# Patient Record
Sex: MALE | Race: WHITE | Employment: OTHER | URBAN - METROPOLITAN AREA
[De-identification: names, ages, dates, MRNs, and addresses within clinical notes are randomized per-mention and may not be internally consistent; named-entity substitution may affect disease eponyms.]

---

## 2017-08-01 ENCOUNTER — APPOINTMENT (OUTPATIENT)
Dept: GENERAL RADIOLOGY | Age: 82
End: 2017-08-01
Payer: COMMERCIAL

## 2017-08-01 ENCOUNTER — APPOINTMENT (OUTPATIENT)
Dept: CT IMAGING | Age: 82
End: 2017-08-01
Payer: COMMERCIAL

## 2017-08-01 ENCOUNTER — HOSPITAL ENCOUNTER (OUTPATIENT)
Age: 82
Setting detail: OBSERVATION
Discharge: HOME | End: 2017-08-02
Attending: EMERGENCY MEDICINE | Admitting: FAMILY MEDICINE
Payer: COMMERCIAL

## 2017-08-01 DIAGNOSIS — R91.8 LUNG MASS: ICD-10-CM

## 2017-08-01 DIAGNOSIS — R47.01 APHASIA: ICD-10-CM

## 2017-08-01 DIAGNOSIS — I67.9 CEREBRAL VASCULAR DISEASE: ICD-10-CM

## 2017-08-01 DIAGNOSIS — R41.82 ALTERED MENTAL STATUS, UNSPECIFIED ALTERED MENTAL STATUS TYPE: Primary | ICD-10-CM

## 2017-08-01 LAB
ABSOLUTE EOS #: 0.1 K/UL (ref 0–0.4)
ABSOLUTE EOS #: 0.1 K/UL (ref 0–0.4)
ABSOLUTE LYMPH #: 1.8 K/UL (ref 1–4.8)
ABSOLUTE LYMPH #: 2 K/UL (ref 1–4.8)
ABSOLUTE MONO #: 0.9 K/UL (ref 0.1–1.3)
ABSOLUTE MONO #: 1.1 K/UL (ref 0.1–1.3)
ALBUMIN SERPL-MCNC: 4.1 G/DL (ref 3.5–5.2)
ALBUMIN SERPL-MCNC: 4.3 G/DL (ref 3.5–5.2)
ALBUMIN/GLOBULIN RATIO: NORMAL (ref 1–2.5)
ALBUMIN/GLOBULIN RATIO: NORMAL (ref 1–2.5)
ALP BLD-CCNC: 84 U/L (ref 40–129)
ALP BLD-CCNC: 87 U/L (ref 40–129)
ALT SERPL-CCNC: 14 U/L (ref 5–41)
ALT SERPL-CCNC: 15 U/L (ref 5–41)
ANION GAP SERPL CALCULATED.3IONS-SCNC: 14 MMOL/L (ref 9–17)
ANION GAP SERPL CALCULATED.3IONS-SCNC: 14 MMOL/L (ref 9–17)
AST SERPL-CCNC: 16 U/L
AST SERPL-CCNC: 23 U/L
BASOPHILS # BLD: 1 %
BASOPHILS # BLD: 1 %
BASOPHILS ABSOLUTE: 0.1 K/UL (ref 0–0.2)
BASOPHILS ABSOLUTE: 0.1 K/UL (ref 0–0.2)
BILIRUB SERPL-MCNC: 0.58 MG/DL (ref 0.3–1.2)
BILIRUB SERPL-MCNC: 0.62 MG/DL (ref 0.3–1.2)
BILIRUBIN DIRECT: 0.13 MG/DL
BILIRUBIN DIRECT: 0.15 MG/DL
BILIRUBIN URINE: NEGATIVE
BILIRUBIN, INDIRECT: 0.45 MG/DL (ref 0–1)
BILIRUBIN, INDIRECT: 0.47 MG/DL (ref 0–1)
BUN BLDV-MCNC: 17 MG/DL (ref 8–23)
BUN BLDV-MCNC: 17 MG/DL (ref 8–23)
BUN/CREAT BLD: NORMAL (ref 9–20)
BUN/CREAT BLD: NORMAL (ref 9–20)
CALCIUM SERPL-MCNC: 9.8 MG/DL (ref 8.6–10.4)
CALCIUM SERPL-MCNC: 9.9 MG/DL (ref 8.6–10.4)
CHLORIDE BLD-SCNC: 100 MMOL/L (ref 98–107)
CHLORIDE BLD-SCNC: 99 MMOL/L (ref 98–107)
CO2: 23 MMOL/L (ref 20–31)
CO2: 23 MMOL/L (ref 20–31)
COLOR: YELLOW
COMMENT UA: NORMAL
CREAT SERPL-MCNC: 0.82 MG/DL (ref 0.7–1.2)
CREAT SERPL-MCNC: 0.84 MG/DL (ref 0.7–1.2)
DIFFERENTIAL TYPE: ABNORMAL
DIFFERENTIAL TYPE: NORMAL
EOSINOPHILS RELATIVE PERCENT: 1 %
EOSINOPHILS RELATIVE PERCENT: 1 %
GFR AFRICAN AMERICAN: >60 ML/MIN
GFR AFRICAN AMERICAN: >60 ML/MIN
GFR NON-AFRICAN AMERICAN: >60 ML/MIN
GFR NON-AFRICAN AMERICAN: >60 ML/MIN
GFR SERPL CREATININE-BSD FRML MDRD: NORMAL ML/MIN/{1.73_M2}
GLOBULIN: NORMAL G/DL (ref 1.5–3.8)
GLOBULIN: NORMAL G/DL (ref 1.5–3.8)
GLUCOSE BLD-MCNC: 91 MG/DL (ref 70–99)
GLUCOSE BLD-MCNC: 94 MG/DL (ref 70–99)
GLUCOSE URINE: NEGATIVE
HCT VFR BLD CALC: 44.9 % (ref 41–53)
HCT VFR BLD CALC: 45.7 % (ref 41–53)
HEMOGLOBIN: 15.1 G/DL (ref 13.5–17.5)
HEMOGLOBIN: 15.4 G/DL (ref 13.5–17.5)
KETONES, URINE: NEGATIVE
LACTIC ACID: 1 MMOL/L (ref 0.5–2.2)
LEUKOCYTE ESTERASE, URINE: NEGATIVE
LYMPHOCYTES # BLD: 15 %
LYMPHOCYTES # BLD: 18 %
MCH RBC QN AUTO: 30.5 PG (ref 26–34)
MCH RBC QN AUTO: 30.5 PG (ref 26–34)
MCHC RBC AUTO-ENTMCNC: 33.6 G/DL (ref 31–37)
MCHC RBC AUTO-ENTMCNC: 33.7 G/DL (ref 31–37)
MCV RBC AUTO: 90.6 FL (ref 80–100)
MCV RBC AUTO: 90.8 FL (ref 80–100)
MONOCYTES # BLD: 10 %
MONOCYTES # BLD: 8 %
NITRITE, URINE: NEGATIVE
PDW BLD-RTO: 13.8 % (ref 11.5–14.9)
PDW BLD-RTO: 14 % (ref 11.5–14.9)
PH UA: 6 (ref 5–8)
PLATELET # BLD: 164 K/UL (ref 150–450)
PLATELET # BLD: 167 K/UL (ref 150–450)
PLATELET ESTIMATE: ABNORMAL
PLATELET ESTIMATE: NORMAL
PMV BLD AUTO: 10 FL (ref 6–12)
PMV BLD AUTO: 9.9 FL (ref 6–12)
POTASSIUM SERPL-SCNC: 4.3 MMOL/L (ref 3.7–5.3)
POTASSIUM SERPL-SCNC: 4.5 MMOL/L (ref 3.7–5.3)
PROTEIN UA: NEGATIVE
RBC # BLD: 4.96 M/UL (ref 4.5–5.9)
RBC # BLD: 5.04 M/UL (ref 4.5–5.9)
RBC # BLD: ABNORMAL 10*6/UL
RBC # BLD: NORMAL 10*6/UL
SEG NEUTROPHILS: 72 %
SEG NEUTROPHILS: 73 %
SEGMENTED NEUTROPHILS ABSOLUTE COUNT: 7.8 K/UL (ref 1.3–9.1)
SEGMENTED NEUTROPHILS ABSOLUTE COUNT: 8.4 K/UL (ref 1.3–9.1)
SODIUM BLD-SCNC: 136 MMOL/L (ref 135–144)
SODIUM BLD-SCNC: 137 MMOL/L (ref 135–144)
SPECIFIC GRAVITY UA: 1.01 (ref 1–1.03)
TOTAL PROTEIN: 6.9 G/DL (ref 6.4–8.3)
TOTAL PROTEIN: 7 G/DL (ref 6.4–8.3)
TROPONIN INTERP: NORMAL
TROPONIN INTERP: NORMAL
TROPONIN T: <0.03 NG/ML
TROPONIN T: <0.03 NG/ML
TURBIDITY: CLEAR
URINE HGB: NEGATIVE
UROBILINOGEN, URINE: NORMAL
WBC # BLD: 10.8 K/UL (ref 3.5–11)
WBC # BLD: 11.5 K/UL (ref 3.5–11)
WBC # BLD: ABNORMAL 10*3/UL
WBC # BLD: NORMAL 10*3/UL

## 2017-08-01 PROCEDURE — 81003 URINALYSIS AUTO W/O SCOPE: CPT

## 2017-08-01 PROCEDURE — 83605 ASSAY OF LACTIC ACID: CPT

## 2017-08-01 PROCEDURE — 99285 EMERGENCY DEPT VISIT HI MDM: CPT

## 2017-08-01 PROCEDURE — 6370000000 HC RX 637 (ALT 250 FOR IP): Performed by: FAMILY MEDICINE

## 2017-08-01 PROCEDURE — G0378 HOSPITAL OBSERVATION PER HR: HCPCS

## 2017-08-01 PROCEDURE — 87040 BLOOD CULTURE FOR BACTERIA: CPT

## 2017-08-01 PROCEDURE — 85025 COMPLETE CBC W/AUTO DIFF WBC: CPT

## 2017-08-01 PROCEDURE — 6360000002 HC RX W HCPCS: Performed by: FAMILY MEDICINE

## 2017-08-01 PROCEDURE — 71010 XR CHEST PORTABLE: CPT

## 2017-08-01 PROCEDURE — 2060000000 HC ICU INTERMEDIATE R&B

## 2017-08-01 PROCEDURE — 80076 HEPATIC FUNCTION PANEL: CPT

## 2017-08-01 PROCEDURE — 84484 ASSAY OF TROPONIN QUANT: CPT

## 2017-08-01 PROCEDURE — 93005 ELECTROCARDIOGRAM TRACING: CPT

## 2017-08-01 PROCEDURE — 70450 CT HEAD/BRAIN W/O DYE: CPT

## 2017-08-01 PROCEDURE — 6370000000 HC RX 637 (ALT 250 FOR IP): Performed by: EMERGENCY MEDICINE

## 2017-08-01 PROCEDURE — 6370000000 HC RX 637 (ALT 250 FOR IP): Performed by: PSYCHIATRY & NEUROLOGY

## 2017-08-01 PROCEDURE — 80048 BASIC METABOLIC PNL TOTAL CA: CPT

## 2017-08-01 PROCEDURE — 96372 THER/PROPH/DIAG INJ SC/IM: CPT

## 2017-08-01 PROCEDURE — 36415 COLL VENOUS BLD VENIPUNCTURE: CPT

## 2017-08-01 RX ORDER — ACETAMINOPHEN 325 MG/1
650 TABLET ORAL EVERY 4 HOURS PRN
Status: DISCONTINUED | OUTPATIENT
Start: 2017-08-01 | End: 2017-08-01

## 2017-08-01 RX ORDER — SODIUM CHLORIDE 0.9 % (FLUSH) 0.9 %
10 SYRINGE (ML) INJECTION EVERY 12 HOURS SCHEDULED
Status: DISCONTINUED | OUTPATIENT
Start: 2017-08-01 | End: 2017-08-02 | Stop reason: HOSPADM

## 2017-08-01 RX ORDER — ROPINIROLE 0.5 MG/1
0.5 TABLET, FILM COATED ORAL NIGHTLY
COMMUNITY

## 2017-08-01 RX ORDER — TRAMADOL HYDROCHLORIDE 50 MG/1
50 TABLET ORAL EVERY 6 HOURS PRN
COMMUNITY

## 2017-08-01 RX ORDER — ASPIRIN 81 MG/1
324 TABLET, CHEWABLE ORAL ONCE
Status: COMPLETED | OUTPATIENT
Start: 2017-08-01 | End: 2017-08-01

## 2017-08-01 RX ORDER — SODIUM CHLORIDE 0.9 % (FLUSH) 0.9 %
10 SYRINGE (ML) INJECTION PRN
Status: DISCONTINUED | OUTPATIENT
Start: 2017-08-01 | End: 2017-08-02 | Stop reason: HOSPADM

## 2017-08-01 RX ORDER — HALOPERIDOL 5 MG/ML
5 INJECTION INTRAMUSCULAR
Status: COMPLETED | OUTPATIENT
Start: 2017-08-01 | End: 2017-08-01

## 2017-08-01 RX ORDER — ACETAMINOPHEN 325 MG/1
650 TABLET ORAL EVERY 4 HOURS PRN
Status: DISCONTINUED | OUTPATIENT
Start: 2017-08-01 | End: 2017-08-02 | Stop reason: HOSPADM

## 2017-08-01 RX ORDER — SODIUM CHLORIDE 0.9 % (FLUSH) 0.9 %
10 SYRINGE (ML) INJECTION EVERY 12 HOURS SCHEDULED
Status: DISCONTINUED | OUTPATIENT
Start: 2017-08-01 | End: 2017-08-01

## 2017-08-01 RX ORDER — LORAZEPAM 0.5 MG/1
0.5 TABLET ORAL EVERY 8 HOURS PRN
Status: DISCONTINUED | OUTPATIENT
Start: 2017-08-01 | End: 2017-08-02 | Stop reason: HOSPADM

## 2017-08-01 RX ORDER — CITALOPRAM 20 MG/1
40 TABLET ORAL DAILY
Status: ON HOLD | COMMUNITY
End: 2017-08-02

## 2017-08-01 RX ORDER — HYDRALAZINE HYDROCHLORIDE 25 MG/1
25 TABLET, FILM COATED ORAL 2 TIMES DAILY PRN
Status: DISCONTINUED | OUTPATIENT
Start: 2017-08-01 | End: 2017-08-02 | Stop reason: HOSPADM

## 2017-08-01 RX ORDER — PREDNISONE 10 MG/1
20 TABLET ORAL DAILY
COMMUNITY

## 2017-08-01 RX ORDER — ATENOLOL 100 MG/1
100 TABLET ORAL 2 TIMES DAILY
COMMUNITY

## 2017-08-01 RX ORDER — TAMSULOSIN HYDROCHLORIDE 0.4 MG/1
0.4 CAPSULE ORAL DAILY
COMMUNITY

## 2017-08-01 RX ORDER — DONEPEZIL HYDROCHLORIDE 10 MG/1
10 TABLET, FILM COATED ORAL NIGHTLY
COMMUNITY

## 2017-08-01 RX ORDER — SODIUM CHLORIDE 0.9 % (FLUSH) 0.9 %
10 SYRINGE (ML) INJECTION PRN
Status: DISCONTINUED | OUTPATIENT
Start: 2017-08-01 | End: 2017-08-01

## 2017-08-01 RX ORDER — TRAZODONE HYDROCHLORIDE 50 MG/1
50 TABLET ORAL NIGHTLY
COMMUNITY

## 2017-08-01 RX ADMIN — HALOPERIDOL LACTATE 5 MG: 5 INJECTION, SOLUTION INTRAMUSCULAR at 21:25

## 2017-08-01 RX ADMIN — LORAZEPAM 0.5 MG: 0.5 TABLET ORAL at 22:45

## 2017-08-01 RX ADMIN — ASPIRIN 81 MG 324 MG: 81 TABLET ORAL at 16:27

## 2017-08-01 ASSESSMENT — ENCOUNTER SYMPTOMS
ABDOMINAL PAIN: 0
BACK PAIN: 0
SHORTNESS OF BREATH: 0

## 2017-08-01 NOTE — FLOWSHEET NOTE
08/01/17 1939   Provider Notification   Reason for Communication Review case   Provider Name  Emiliehaylie Parish   Provider Notification Physician   Method of Communication Call   Response See orders   Notification Time Dickson Gutierrez spoke with physician regarding pt statement that he wants to kill himself. Writer was also informed that the pt has a CCW and a gun at home. Security is called by management to search pt and room for weapon. 1:1 was called in to sit with patient regarding suicidal thoughts. Someone with pt at this time. Pt calm laying in bed. Orders were given for agitation as the patient has dementia and sundowners. Writer was informed that the patient has a history of aggression when in similar situations. Will continue to monitor.

## 2017-08-01 NOTE — IP AVS SNAPSHOT
Patient Information     Patient Name LIZZ Up 1928      Important Information for Stroke      If you have a current diagnosis or history of any of the following, please review the information carefully. STROKE PATIENTS:  If you notice any sign or symptom that indicates that you may be having a stroke, activate the emergency medical services immediately by calling [id number]. These symptoms include: uneven facial expression, arm(s) drifting down, strange speech or loss of speech, vision problems, sudden severe headache, sudden numbness or face/arms/legs, sudden confusion or difficult understanding, sudden dizziness, sudden difficulty with walking. Continue or begin taking the medications prescribed by your physician as listed above. There are personal risk that are associated with [place]. Anyone can have a stroke no matter your age, race or gender. The chances of having a stroke increase if you have certain risk factors. The best way to protect yourself and loved ones from stroke is to understand your personal risk and how to manage it. There are 2 types of risk factors for stroke: uncontrollable and controllable. Uncontrollable risk factors include being over age 54, being male, being ,  or /, or having a personal or family history of a stroke or transient ischemic attack (TIA). Controllable risk factors generally fall into two categories: lifestyle risk factors or medical risk factors. Lifestyle risk factors can often be changed, while medical risk factors can usually be treated. Both types can be managed best by working with a doctor, who can prescribe medications and advise on how to adopt a healthy lifestyle. Controllable risk factors include high blood pressure, atrial fibrillation, high cholesterol, diabetes, atherosclerosis, circulation problems, tobacco use or smoking, alcohol use, lack of exercise, and obesity.

## 2017-08-01 NOTE — ED PROVIDER NOTES
[facility]  eMERGENCY dEPARTMENT eNCOUnter      Pt Name: Jimmy Mendoza  MRN: [id number]  [place] 1/21/1928  Date of evaluation: 8/1/17      CHIEF COMPLAINT       Chief Complaint   Patient presents with    Altered Mental Status         HISTORY OF PRESENT ILLNESS   HPI 80 y.o. male is brought to the emergency department by EMS for confusion. The patient was found wandering at a gas station confused, with garbled speech. Unknown last known normal.  EMS report that while in transport his speech greatly improved. Patient states that he \"doesn't know what's going on\". He's having difficulty expressing himself. He doesn't know what time the symptoms started. Denies any weakness in his arms or legs. REVIEW OF SYSTEMS       Review of Systems   Constitutional: Negative for fever. HENT: Negative for congestion. Eyes: Negative for visual disturbance. Respiratory: Negative for shortness of breath. Cardiovascular: Negative for chest pain. Gastrointestinal: Negative for abdominal pain. Endocrine: Negative for polyuria. Musculoskeletal: Negative for back pain. Neurological: Positive for speech difficulty. Negative for tremors, seizures, syncope, weakness, light-headedness, numbness and headaches. Hematological: Negative for adenopathy.        PAST MEDICAL HISTORY     Past Medical History:   Diagnosis Date    BPH (benign prostatic hyperplasia)     Hyperlipidemia     Hypertension        SURGICAL HISTORY       Past Surgical History:   Procedure Laterality Date    AORTIC VALVE REPLACEMENT      BACK SURGERY      EYE SURGERY      FEMUR FRACTURE SURGERY  1970    plates and screws       CURRENT MEDICATIONS       Current Discharge Medication List      CONTINUE these medications which have NOT CHANGED    Details   atenolol (TENORMIN) 100 MG tablet Take 100 mg by mouth 2 times daily      traMADol (ULTRAM) 50 MG tablet Take 50 mg by mouth every 6 hours as needed for Pain      tamsulosin (FLOMAX) 0.4 MG capsule Take 0.4 mg by mouth daily      rOPINIRole (REQUIP) 0.5 MG tablet Take 0.5 mg by mouth nightly Hasn't had this filled since 5/17/17 (30 day supply)      citalopram (CELEXA) 20 MG tablet Take 40 mg by mouth daily      donepezil (ARICEPT) 10 MG tablet Take 10 mg by mouth nightly      Lansoprazole (PREVACID PO) Take 30 mg by mouth daily      predniSONE (DELTASONE) 10 MG tablet Take 20 mg by mouth daily      traZODone (DESYREL) 50 MG tablet Take 50 mg by mouth nightly             ALLERGIES     has No Known Allergies. FAMILY HISTORY     has no family status information on file. SOCIAL HISTORY      reports that he quit smoking about 59 years ago. His smoking use included Cigarettes. He does not have any smokeless tobacco history on file. He reports that he does not drink alcohol or use illicit drugs. PHYSICAL EXAM     INITIAL VITALS: BP (!) 200/68  Pulse 52  Temp 97.3 °F (36.3 °C) (Oral)   Resp 16  Ht 5' 10\" (1.778 m)  Wt 180 lb 1.9 oz (81.7 kg)  SpO2 99%  BMI 25.84 kg/m2    Head: Normocephalic, atraumatic  Eye: Pupils equal round reactive to light, no conjunctivitis  Heart: Regular rate and rhythm no murmurs  Lungs: Clear to auscultation bilaterally, no respiratory distress  Chest wall: No crepitus, no tenderness palpation  Abdomen: Soft, nontender, nondistended, with no peritoneal signs  Neurologic: Patient is alert and oriented to person and place. Aware of name but not date. Not aware of current events. Mild expressive aphasia. Motor and sensation is intact in all 4 extremities,  Cerebellar function is normal to finger nose. Extinction intact. NIHSS - 1 for aphasia. Extremities: Full range of motion, no cyanosis, no edema, no signs of trauma, no tenderness to palpation    MEDICAL DECISION MAKING:     MDM  79 yo M presenting with an expressive aphasia. No TPA given as unknown last normal.   Unable to provided contact information, number provided is disconnected.    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 CXR shows opacity at the left lung apex. Concerning for cancer, possible central metastatic disease. D/w Dr. Michael Conner, will admit to the hospital.  Neurology consulted. Late entry:   Please note, I contact internal medicine service in error. When I relalized PCP is Dr. Sukhwinder Osei, I d/w Dr. Michael Conner, informed him that it was Dr. Anson Briones patient and then discussed the case with Dr. Sukhwinder Osei and placed bridging orders under his name. DIAGNOSTIC RESULTS     EKG: All EKG's are interpreted by the Emergency Department Physician who either signs or Co-signs this chart in the absence of a cardiologist.  EKG shows a sinus bradycardia with a first-degree AV block. There is 250, QRS of 100, QTC of 425 no ST segment elevation or depression no T-wave inversions axis is normal.      RADIOLOGY:All plain film, CT, MRI, and formal ultrasound images (except ED bedside ultrasound) are read by the radiologist and the images and interpretations are directly viewed by the emergency physician. XR Chest Portable   Final Result   Opacity at the left lung apex. Although this may be secondary to overlying   rib shadows, an apical mass cannot be excluded. Further evaluation with   chest CT is recommended. CT Head WO Contrast   Final Result   1. No clear evidence for acute intracranial hemorrhage or midline shift. 2. Severe chronic small vessel ischemic white matter disease with bilateral   lacunar infarcts as above. If there is clinical concern for acute on chronic   ischemia, an MRI with diffusion-weighted imaging would be a more sensitive   exam.   3. Atherosclerotic calcification of the vasculature. 4. Mild to moderate atrophy. LABS: All lab results were reviewed by myself, and all abnormals are listed below.   Labs Reviewed   CBC WITH AUTO DIFFERENTIAL - Abnormal; Notable for the [address]:  Current Discharge Medication List            Govind Orantes MD  Attending Emergency Physician                      Govind Orantes MD  08/01/17 2128       Govind Orantes MD  08/01/17 2130

## 2017-08-01 NOTE — PROGRESS NOTES
Home medication list [facility]    Electronically signed by Lety Dixon RN on 8/1/2017 at 5:55 PM

## 2017-08-01 NOTE — FLOWSHEET NOTE
08/01/17 1921   Provider Notification   Reason for Communication Review case;New orders   Provider Name Dr Sharyle Scarpa   Provider Notification Physician   Method of Communication Call   Response See orders   Notification Time 1921     MD called to ask if pt can receive lovenox when MRI hasn't been done, if pt can eat, and pt's blood pressure. MD gave telephone orders for a soft mechanical diet to advance as tolerated, give PRN hydralazine when SBP is above 180 or DBP above 80, and pt can receive lovenox.

## 2017-08-01 NOTE — IP AVS SNAPSHOT
After Visit Summary  (Discharge Instructions)    Medication List for Home    Based on the information you provided to us as well as any changes during this visit, the following is your updated medication list.  Compare this with your prescription bottles at home. If you have any questions or concerns, contact your primary care physician's office. Daily Medication List (This medication list can be shared with any healthcare provider who is helping you manage your medications)      There are NEW medications for you.  START taking them after you leave the hospital        Last Dose    Next Dose Due AM NOON PM NIGHT    aspirin 81 MG chewable tablet   Take 4 tablets by mouth daily                324 mg on 8/2/2017  2:55 PM                            buffered aspirin 325 MG Tabs   Take 1 tablet by mouth daily                                           You told us you were taking these medications at home, but the amount or how often you take this medication has CHANGED        Last Dose    Next Dose Due AM NOON PM NIGHT    citalopram 20 MG tablet   Commonly known as:  CELEXA   Take 1 tablet by mouth daily   What changed:  how much to take                                           These are medications you told us you were taking at home, CONTINUE taking them after you leave the hospital        Last Dose    Next Dose Due AM NOON PM NIGHT    atenolol 100 MG tablet   Commonly known as:  TENORMIN   Take 100 mg by mouth 2 times daily                                         donepezil 10 MG tablet   Commonly known as:  ARICEPT   Take 10 mg by mouth nightly                                         predniSONE 10 MG tablet   Commonly known as:  DELTASONE   Take 20 mg by mouth daily                                         PREVACID PO   Take 30 mg by mouth daily                                         rOPINIRole 0.5 MG tablet   Commonly known as:  REQUIP Take 0.5 mg by mouth nightly Hasn't had this filled since 5/17/17 (30 day supply)                                         tamsulosin 0.4 MG capsule   Commonly known as:  FLOMAX   Take 0.4 mg by mouth daily                                         traMADol 50 MG tablet   Commonly known as:  ULTRAM   Take 50 mg by mouth every 6 hours as needed for Pain                                         traZODone 50 MG tablet   Commonly known as:  DESYREL   Take 50 mg by mouth nightly                                              Where to Get Your Medications      These medications were sent to [facility] [address] [place] [address], [address] [place] [id number]     Phone:  [phone]     citalopram 20 MG tablet         You can get these medications from any pharmacy     Bring a paper prescription for each of these medications     aspirin 81 MG chewable tablet    buffered aspirin 325 MG Tabs               Allergies as of 8/2/2017     No Known Allergies      Immunizations as of 8/2/2017  Reviewed on 8/1/2017    Name Date Dose VIS Date Route    Pneumococcal 13-valent Conjugate Anhrickey Burton)  Deferred () 0.5 mL 11/5/2015 Intramuscular    Pneumococcal Polysaccharide ([id number]) 9/1/2013 -- -- --      Last Vitals          Most Recent Value    Temp  97.7 °F (36.5 °C)    Pulse  63    Resp  18    BP  (!)  180/65         After Visit Summary    This summary was created for you. Thank you for entrusting your care to us.   The following information includes details about your hospital/visit stay along with steps you should take to help with your recovery once you leave the hospital.  In this packet, you will find information about the topics listed below:    · Instructions about your medications including a list of your home medications  · A summary of your hospital visit  · Follow-up appointments once you have left the hospital  · Your care plan at home Tetanus Combination Vaccine (1 - Tdap) 1/21/1947    Zoster Vaccine 1/21/1988    Pneumococcal Vaccines (two) for all adults aged 72 and over (2 of 2 - PCV13) 9/1/2014    Yearly Flu Vaccine (1) 8/1/2017                 Care Plan Once You Return Home    This section includes instructions you will need to follow once you leave the hospital.  Your care team will discuss these with you, so you and those caring for you know how to best care for your health needs at home. This section may also include educational information about certain health topics that may be of help to you. Important information for a smoker       SMOKING: QUIT SMOKING. THIS IS THE MOST IMPORTANT ACTION YOU CAN TAKE TO IMPROVE YOUR CURRENT AND FUTURE HEALTH. Call the [address] [place] [address] [facility] at [facility] ([phone])    Smoking harms nonsmokers. When nonsmokers are around people who smoke, they absorb nicotine, carbon monoxide, and other ingredients of tobacco smoke. DO NOT SMOKE AROUND CHILDREN     Children exposed to secondhand smoke are at an increased risk of:  Sudden Infant Death Syndrome (SIDS), acute respiratory infections, inflammation of the middle ear, and severe asthma. Over a longer time, it causes heart disease and lung cancer. There is no safe level of exposure to secondhand smoke. [employer] Signup     [employer] allows you to send messages to your doctor, view your test results, renew your prescriptions, schedule appointments, view visit notes, and more. How Do I Sign Up? 1. In your Internet browser, go to [email or web address]  2. Click on the Sign Up Now link in the Sign In box. You will see the New Member Sign Up page. 3. Enter your [employer] Access Code exactly as it appears below. You will not need to use this code after youve completed the sign-up process. If you do not sign up before the expiration date, you must request a new code. protect my health information. A complete copy of the After Visit Summary has been given to me, the patient and/or responsible adult. Patient Signature/Responsible Adult:____________________    Clinician Signature:_____________________    Date:_____________________    Time:_____________________        More Information           aspirin (oral)  Pronunciation:  AS pir in  Brand:  Arthritis Pain, Ascriptin Enteric, Aspir 81, Aspir-Low, [place] Aspirin, [place] Childrens Aspirin, Bufferin, Easprin, Ecotrin, Ecpirin, Fasprin, [place], Miniprin, [place] Aspirin  What is the most important information I should know about aspirin? You should not use aspirin if you have a bleeding disorder such as hemophilia, a recent history of stomach or intestinal bleeding, or if you are allergic to an NSAID (non-steroidal anti-inflammatory drug) such as Advil, Motrin, Aleve, Orudis, Indocin, Lodine, Voltaren, Toradol, Mobic, Relafen, Feldene, and others. Do not give this medication to a child or teenager with a fever, flu symptoms, or chicken pox. Salicylates can cause Reye's syndrome, a serious and sometimes fatal condition in children. What is aspirin? Aspirin is a salicylate ([id number]-ITK-[id number]-ate). It works by reducing substances in the body that cause pain, fever, and inflammation. Aspirin is used to treat pain, and reduce fever or inflammation. Aspirin is sometimes used to treat or prevent heart attacks, strokes, and chest pain (angina). Aspirin should be used for cardiovascular conditions only under the supervision of a doctor. Aspirin may also be used for purposes not listed in this medication guide. What should I discuss with my healthcare provider before taking aspirin? Do not give this medication to a child or teenager with a fever, flu symptoms, or chicken pox. Aspirin can cause Reye's syndrome, a serious and sometimes fatal condition in children.

## 2017-08-02 ENCOUNTER — APPOINTMENT (OUTPATIENT)
Dept: CT IMAGING | Age: 82
End: 2017-08-02
Payer: COMMERCIAL

## 2017-08-02 VITALS
DIASTOLIC BLOOD PRESSURE: 65 MMHG | HEART RATE: 63 BPM | WEIGHT: 177.03 LBS | RESPIRATION RATE: 18 BRPM | TEMPERATURE: 97.7 F | SYSTOLIC BLOOD PRESSURE: 180 MMHG | OXYGEN SATURATION: 97 % | BODY MASS INDEX: 25.34 KG/M2 | HEIGHT: 70 IN

## 2017-08-02 PROBLEM — F01.50 DEMENTIA, MULTIINFARCT (HCC): Status: ACTIVE | Noted: 2017-08-02

## 2017-08-02 PROBLEM — F32.9 MAJOR DEPRESSION: Status: ACTIVE | Noted: 2017-08-02

## 2017-08-02 PROBLEM — I63.9 CVA (CEREBRAL VASCULAR ACCIDENT) (HCC): Status: ACTIVE | Noted: 2017-08-02

## 2017-08-02 LAB
ABSOLUTE EOS #: 0.1 K/UL (ref 0–0.4)
ABSOLUTE LYMPH #: 2.1 K/UL (ref 1–4.8)
ABSOLUTE MONO #: 1.3 K/UL (ref 0.1–1.3)
ANION GAP SERPL CALCULATED.3IONS-SCNC: 15 MMOL/L (ref 9–17)
BASOPHILS # BLD: 0 %
BASOPHILS ABSOLUTE: 0 K/UL (ref 0–0.2)
BUN BLDV-MCNC: 15 MG/DL (ref 8–23)
BUN/CREAT BLD: NORMAL (ref 9–20)
CALCIUM SERPL-MCNC: 9.6 MG/DL (ref 8.6–10.4)
CHLORIDE BLD-SCNC: 100 MMOL/L (ref 98–107)
CHOLESTEROL/HDL RATIO: 5.4
CHOLESTEROL: 250 MG/DL
CO2: 22 MMOL/L (ref 20–31)
CREAT SERPL-MCNC: 0.78 MG/DL (ref 0.7–1.2)
DIFFERENTIAL TYPE: NORMAL
EOSINOPHILS RELATIVE PERCENT: 1 %
GFR AFRICAN AMERICAN: >60 ML/MIN
GFR NON-AFRICAN AMERICAN: >60 ML/MIN
GFR SERPL CREATININE-BSD FRML MDRD: NORMAL ML/MIN/{1.73_M2}
GFR SERPL CREATININE-BSD FRML MDRD: NORMAL ML/MIN/{1.73_M2}
GLUCOSE BLD-MCNC: 88 MG/DL (ref 70–99)
HCT VFR BLD CALC: 42.3 % (ref 41–53)
HDLC SERPL-MCNC: 46 MG/DL
HEMOGLOBIN: 14.4 G/DL (ref 13.5–17.5)
LDL CHOLESTEROL: 159 MG/DL (ref 0–130)
LYMPHOCYTES # BLD: 20 %
MCH RBC QN AUTO: 30.8 PG (ref 26–34)
MCHC RBC AUTO-ENTMCNC: 34 G/DL (ref 31–37)
MCV RBC AUTO: 90.5 FL (ref 80–100)
MONOCYTES # BLD: 12 %
PDW BLD-RTO: 13.9 % (ref 11.5–14.9)
PLATELET # BLD: 161 K/UL (ref 150–450)
PLATELET ESTIMATE: NORMAL
PMV BLD AUTO: 10.8 FL (ref 6–12)
POTASSIUM SERPL-SCNC: 3.8 MMOL/L (ref 3.7–5.3)
RBC # BLD: 4.68 M/UL (ref 4.5–5.9)
RBC # BLD: NORMAL 10*6/UL
SEG NEUTROPHILS: 67 %
SEGMENTED NEUTROPHILS ABSOLUTE COUNT: 7.1 K/UL (ref 1.3–9.1)
SODIUM BLD-SCNC: 137 MMOL/L (ref 135–144)
TRIGL SERPL-MCNC: 223 MG/DL
VLDLC SERPL CALC-MCNC: ABNORMAL MG/DL (ref 1–30)
WBC # BLD: 10.6 K/UL (ref 3.5–11)
WBC # BLD: NORMAL 10*3/UL

## 2017-08-02 PROCEDURE — G0378 HOSPITAL OBSERVATION PER HR: HCPCS

## 2017-08-02 PROCEDURE — 2580000003 HC RX 258: Performed by: INTERNAL MEDICINE

## 2017-08-02 PROCEDURE — 80061 LIPID PANEL: CPT

## 2017-08-02 PROCEDURE — 6360000002 HC RX W HCPCS: Performed by: FAMILY MEDICINE

## 2017-08-02 PROCEDURE — 2580000003 HC RX 258: Performed by: PSYCHIATRY & NEUROLOGY

## 2017-08-02 PROCEDURE — 80048 BASIC METABOLIC PNL TOTAL CA: CPT

## 2017-08-02 PROCEDURE — 70470 CT HEAD/BRAIN W/O & W/DYE: CPT

## 2017-08-02 PROCEDURE — 6360000004 HC RX CONTRAST MEDICATION: Performed by: PSYCHIATRY & NEUROLOGY

## 2017-08-02 PROCEDURE — 6370000000 HC RX 637 (ALT 250 FOR IP): Performed by: FAMILY MEDICINE

## 2017-08-02 PROCEDURE — 36415 COLL VENOUS BLD VENIPUNCTURE: CPT

## 2017-08-02 PROCEDURE — 71260 CT THORAX DX C+: CPT

## 2017-08-02 PROCEDURE — 85025 COMPLETE CBC W/AUTO DIFF WBC: CPT

## 2017-08-02 PROCEDURE — 96372 THER/PROPH/DIAG INJ SC/IM: CPT

## 2017-08-02 PROCEDURE — 6370000000 HC RX 637 (ALT 250 FOR IP): Performed by: PSYCHIATRY & NEUROLOGY

## 2017-08-02 RX ORDER — ASPIRIN 81 MG/1
324 TABLET, CHEWABLE ORAL DAILY
Qty: 30 TABLET | Refills: 3 | Status: SHIPPED | OUTPATIENT
Start: 2017-08-02

## 2017-08-02 RX ORDER — CITALOPRAM 20 MG/1
20 TABLET ORAL DAILY
Qty: 30 TABLET | Refills: 3 | Status: SHIPPED | OUTPATIENT
Start: 2017-08-02

## 2017-08-02 RX ORDER — CITALOPRAM 20 MG/1
20 TABLET ORAL DAILY
Status: DISCONTINUED | OUTPATIENT
Start: 2017-08-02 | End: 2017-08-02 | Stop reason: HOSPADM

## 2017-08-02 RX ORDER — DONEPEZIL HYDROCHLORIDE 10 MG/1
10 TABLET, FILM COATED ORAL NIGHTLY
Status: DISCONTINUED | OUTPATIENT
Start: 2017-08-02 | End: 2017-08-02 | Stop reason: HOSPADM

## 2017-08-02 RX ORDER — ASPIRIN 81 MG/1
324 TABLET, CHEWABLE ORAL DAILY
Status: DISCONTINUED | OUTPATIENT
Start: 2017-08-02 | End: 2017-08-02 | Stop reason: HOSPADM

## 2017-08-02 RX ORDER — 0.9 % SODIUM CHLORIDE 0.9 %
100 INTRAVENOUS SOLUTION INTRAVENOUS ONCE
Status: COMPLETED | OUTPATIENT
Start: 2017-08-02 | End: 2017-08-02

## 2017-08-02 RX ORDER — ASPIRIN/CALCIUM/MAG/ALUMINUM 325 MG
1 TABLET ORAL DAILY
Qty: 30 TABLET | Refills: 3 | Status: SHIPPED | OUTPATIENT
Start: 2017-08-02

## 2017-08-02 RX ORDER — SODIUM CHLORIDE 0.9 % (FLUSH) 0.9 %
10 SYRINGE (ML) INJECTION PRN
Status: DISCONTINUED | OUTPATIENT
Start: 2017-08-02 | End: 2017-08-02 | Stop reason: HOSPADM

## 2017-08-02 RX ORDER — CITALOPRAM 40 MG/1
40 TABLET ORAL DAILY
Status: DISCONTINUED | OUTPATIENT
Start: 2017-08-02 | End: 2017-08-02

## 2017-08-02 RX ADMIN — ENOXAPARIN SODIUM 40 MG: 40 INJECTION SUBCUTANEOUS at 08:59

## 2017-08-02 RX ADMIN — ASPIRIN 81 MG 324 MG: 81 TABLET ORAL at 14:55

## 2017-08-02 RX ADMIN — Medication 10 ML: at 09:00

## 2017-08-02 ASSESSMENT — PAIN SCALES - GENERAL
PAINLEVEL_OUTOF10: 3
PAINLEVEL_OUTOF10: 0
PAINLEVEL_OUTOF10: 3
PAINLEVEL_OUTOF10: 5
PAINLEVEL_OUTOF10: 3

## 2017-08-02 ASSESSMENT — PAIN DESCRIPTION - LOCATION: LOCATION: HEAD

## 2017-08-02 ASSESSMENT — PAIN DESCRIPTION - PAIN TYPE: TYPE: ACUTE PAIN

## 2017-08-02 NOTE — PROGRESS NOTES
RN rounded with Dr. Dung Ewing at the bedside. At this time Dr. Dung Ewing does not feel a sitter at the bedside is necessary as the patient is not expressing suicidal thoughts and will be discontinued. Dr. Dung Ewing also stated that the patient does not need to be admitted to [facility] and can be discharged to home when medically cleared.

## 2017-08-02 NOTE — FLOWSHEET NOTE
08/01/17 2132   Provider Notification   Reason for Communication Review case   Provider Name Dr Sukhwinder Osei   Provider Notification Physician   Method of Communication Call   Response Waiting for response   Notification Time 2133       Writer spoke with provider regarding pt attempting to leave and getting physical with staff after being told he has to stay due to claims he made regarding having thoughts of hurting himself. The patient was placed in 4 points and given an IM injection. Pt was monitored afterwards and seems to be calming down at this point. Will continue to monitor.  See new orders

## 2017-08-02 NOTE — PROGRESS NOTES
Writer spoke with patients daughter who is an RN. She was notified about the events of this shift and the patients current condition.

## 2017-08-02 NOTE — PLAN OF CARE
Problem: Health Behavior:  Goal: Ability to verbalize adaptive coping strategies to use when suicidal feelings occur will improve  Ability to verbalize adaptive coping strategies to use when suicidal feelings occur will improve   Outcome: Ongoing  Pt shows no signs of improving in coping strategies. Problem: Falls - Risk of  Goal: Absence of falls  Outcome: Ongoing  Bed remains in lowest position, call light within reach. Patient remains free of falls at this time. RN will continue to monitor. Problem: Risk for Impaired Skin Integrity  Goal: Tissue integrity - skin and mucous membranes  Structural intactness and normal physiological function of skin and  mucous membranes. Outcome: Ongoing  Patient turned and repositioned every 2 hours and as needed for comfort. Skin remains dry and intact. No new skin breakdown noted. Problem: Restraint Use - Nonviolent/Non-Self-Destructive Behavior:  Goal: Absence of restraint indications  Absence of restraint indications   Outcome: Ongoing  Restraints removed at this time.

## 2017-08-02 NOTE — PROGRESS NOTES
Spoke with Heydi Quintanilla in regards to stroke protocol and orders. Notified LDL of 159, at this time will not order statin and will discuss with patient in office in 1 week. In regards to ordering PT and OT. At this time per physician patient back to normal functioning and no need for therapy orders. Order to d/c EEG and discharge patient today. Follow up in 1 week.

## 2017-08-02 NOTE — PROGRESS NOTES
RN spoke with Dr. Robert Thompson and updated that Dr. Kelsey Lam would like the EEG for tomorrow cancelled. At this time RN also updated Dr. Robert Thompson that he would like the patient discharged tonight after reviewing the results of the CT head and chest and to follow-up outpatient.

## 2017-08-02 NOTE — PROGRESS NOTES
RN spoke to Dr. Gi Marie in regards to orders after speaking to CT head. Received order for a CT head with and without contrast at this time and to continue the 324 aspirin. No other orders received at this time.

## 2017-08-02 NOTE — PROGRESS NOTES
Pt leg restraints were removed around 10:30pm and his wrist restraints were removed around 12:25am. Patient was agreeable to staying in room and being cooperative with an assessment and a couple of medications. Sitter remains at bed side and is continuously monitoring the patient for changes is status.

## 2017-08-02 NOTE — CONSULTS
in transport his speech greatly improved. Patient states that he \"doesn't know what's going on\". He's having difficulty expressing himself. He doesn't know what time the symptoms started. Denies any weakness in his arms or legs.     REVIEW OF SYSTEMS        Review of Systems   Constitutional: Negative for fever. HENT: Negative for congestion. Eyes: Negative for visual disturbance. Respiratory: Negative for shortness of breath. Cardiovascular: Negative for chest pain. Gastrointestinal: Negative for abdominal pain. Endocrine: Negative for polyuria. Musculoskeletal: Negative for back pain. Neurological: Positive for speech difficulty. Negative for tremors, seizures, syncope, weakness, light-headedness, numbness and headaches. Hematological: Negative for adenopathy.        PHYSICAL EXAM:       BP (!) 189/69  Pulse 59  Temp 97.5 °F (36.4 °C) (Oral)   Resp 18  Ht 5' 10\" (1.778 m)  Wt 177 lb 0.5 oz (80.3 kg)  SpO2 95%  BMI 25.4 kg/m2      LABS AND IMAGING:       Admission on 08/01/2017   Component Date Value Ref Range Status    WBC 08/01/2017 11.5* 3.5 - 11.0 k/uL Final    RBC 08/01/2017 4.96  4.5 - 5.9 m/uL Final    Hemoglobin 08/01/2017 15.1  13.5 - 17.5 g/dL Final    Hematocrit 08/01/2017 44.9  41 - 53 % Final    MCV 08/01/2017 90.6  80 - 100 fL Final    MCH 08/01/2017 30.5  26 - 34 pg Final    MCHC 08/01/2017 33.7  31 - 37 g/dL Final    RDW 08/01/2017 13.8  11.5 - 14.9 % Final    Platelets 45/08/4608 164  150 - 450 k/uL Final    MPV 08/01/2017 9.9  6.0 - 12.0 fL Final    Differential Type 08/01/2017 NOT REPORTED   Final    Seg Neutrophils 08/01/2017 73  % Final    Lymphocytes 08/01/2017 15  % Final    Monocytes 08/01/2017 10  % Final    Eosinophils % 08/01/2017 1  % Final    Basophils 08/01/2017 1  % Final    Segs Absolute 08/01/2017 8.40  1.3 - 9.1 k/uL Final    Absolute Lymph # 08/01/2017 1.80  1.0 - 4.8 k/uL Final    Absolute Mono # 08/01/2017 1.10  0.1 - 1.3 k/uL old:   76 mL/min/1.73sq m  Chronic Kidney Disease:   <60 mL/min/1.73sq m  Kidney failure:   <15 mL/min/1.73sq m              eGFR calculated using average adult body mass. Additional eGFR calculator   available at:        [email or web address]        Performed at [facility]: PAIGE Bedolla. [address]   [phone]      GFR Staging 08/01/2017 NOT REPORTED   Final    Lactic Acid 08/01/2017 1.0  0.5 - 2.2 mmol/L Final    Comment: Performed at [facility]: PAIGE Bedolla. [address]   [phone]      Specimen Description 08/01/2017 . BLOOD Performed at [facility]: PAIGE Bedolla. [place], [place]   Final    Specimen Description 08/01/2017  [address] [phone]   Final    Special Requests 08/01/2017 RTAC 20 CC Performed at [facility]: PAIGE Songe. [place]   Final    Special Requests 08/01/2017 , [place] [address] [phone]   Final    Culture 08/01/2017 NO GROWTH 10 HOURS   Final    Culture 08/01/2017 Performed at [facility] [address] [place] [address] [phone]   Final    Status 08/01/2017 Pending   Incomplete    Alb 08/01/2017 4.3  3.5 - 5.2 g/dL Final    Alkaline Phosphatase 08/01/2017 87  40 - 129 U/L Final    ALT 08/01/2017 14  5 - 41 U/L Final    AST 08/01/2017 23  <40 U/L Final    Total Bilirubin 08/01/2017 0.62  0.3 - 1.2 mg/dL Final    Bilirubin, Direct 08/01/2017 0.15  <0.31 mg/dL Final    Bilirubin, Indirect 08/01/2017 0.47  0.00 - 1.00 mg/dL Final    Total Protein 08/01/2017 7.0  6.4 - 8.3 g/dL Final    Comment: Performed at [facility]: PAIGE Songe. [address]   [phone]      Globulin 08/01/2017 NOT REPORTED  1.5 - 3.8 g/dL Final    Albumin/Globulin Ratio 08/01/2017 NOT REPORTED  1.0 - 2.5 Final    Troponin T 08/01/2017 <0.03  <0.03 ng/mL Final    Troponin T results cannot be compared to Troponin-I results.     Troponin Interp 08/01/2017        Final    Comment: Reference Range:   <0.03     Within reference range. 0.03-0.09 Possible myocardial damage. Repeat at appropriate intervals to rule out chronic elevation.   >= 0.10   Indicative of myocardial damage. Performed at [facility]: PAIGE SHORT [address] [facility]. [address]   [phone]      WBC 08/02/2017 10.6  3.5 - 11.0 k/uL Final    RBC 08/02/2017 4.68  4.5 - 5.9 m/uL Final    Hemoglobin 08/02/2017 14.4  13.5 - 17.5 g/dL Final    Hematocrit 08/02/2017 42.3  41 - 53 % Final    MCV 08/02/2017 90.5  80 - 100 fL Final    MCH 08/02/2017 30.8  26 - 34 pg Final    MCHC 08/02/2017 34.0  31 - 37 g/dL Final    RDW 08/02/2017 13.9  11.5 - 14.9 % Final    Platelets 10/63/9536 161  150 - 450 k/uL Final    MPV 08/02/2017 10.8  6.0 - 12.0 fL Final    Differential Type 08/02/2017 NOT REPORTED   Final    Seg Neutrophils 08/02/2017 67  % Final    Lymphocytes 08/02/2017 20  % Final    Monocytes 08/02/2017 12  % Final    Eosinophils % 08/02/2017 1  % Final    Basophils 08/02/2017 0  % Final    Segs Absolute 08/02/2017 7.10  1.3 - 9.1 k/uL Final    Absolute Lymph # 08/02/2017 2.10  1.0 - 4.8 k/uL Final    Absolute Mono # 08/02/2017 1.30  0.1 - 1.3 k/uL Final    Absolute Eos # 08/02/2017 0.10  0.0 - 0.4 k/uL Final    Basophils # 08/02/2017 0.00  0.0 - 0.2 k/uL Final    Comment: Performed at [facility]: PAIGE KEATING W [address] [facility].  [address]   [phone]      WBC Morphology 08/02/2017 NOT REPORTED   Final    RBC Morphology 08/02/2017 NOT REPORTED   Final    Platelet Estimate 51/29/7213 NOT REPORTED   Final    Glucose 08/02/2017 88  70 - 99 mg/dL Final    BUN 08/02/2017 15  8 - 23 mg/dL Final    CREATININE 08/02/2017 0.78  0.70 - 1.20 mg/dL Final    Bun/Cre Ratio 08/02/2017 NOT REPORTED  9 - 20 Final    Calcium 08/02/2017 9.6  8.6 - 10.4 mg/dL Final    Sodium 08/02/2017 137  135 - 144 mmol/L Final    Potassium 08/02/2017 3.8  3.7 - 5.3 mmol/L Final    Chloride 08/02/2017 100  98 - 107 mmol/L Final    CO2 08/02/2017 22  20 - 31 mmol/L Final    Anion Gap 08/02/2017 15  9 - 17 mmol/L Final    GFR Non- 08/02/2017 >60  >60 mL/min Final    GFR  08/02/2017 >60  >60 mL/min Final    GFR Comment 08/02/2017        Final    Comment: Average GFR for 79or more years old:   76 mL/min/1.73sq m  Chronic Kidney Disease:   <60 mL/min/1.73sq m  Kidney failure:   <15 mL/min/1.73sq m              eGFR calculated using average adult body mass. Additional eGFR calculator   available at:        [email or web address]        Performed at [address] [place] [address] Dr Charissa Bedolla. [address]   [phone]      GFR Staging 08/02/2017 NOT REPORTED   Final    Cholesterol 08/02/2017 250* <200 mg/dL Final    Comment:    Cholesterol Guidelines:      <200  Desirable   200-240  Borderline      >240  Undesirable         HDL 08/02/2017 46  >40 mg/dL Final    Comment:    HDL Guidelines:    <40     Undesirable   40-59    Borderline    >59     Desirable         LDL Cholesterol 08/02/2017 159* 0 - 130 mg/dL Final    Comment:    LDL Guidelines:     <100    Desirable   100-129   Near to/above Desirable   130-159   Borderline      >159   Undesirable     Direct (measured) LDL and calculated LDL are not interchangeable tests.  Chol/HDL Ratio 08/02/2017 5.4* <5 Final            Triglycerides 08/02/2017 223* <150 mg/dL Final    Comment:    Triglyceride Guidelines:     <150   Desirable   150-199  Borderline   200-499  High     >499   Very high   Based on AHA Guidelines for fasting triglyceride, October 2012. Performed at [address] [place] [address] Dr Charissa Bedolla.  [address]   [phone]      VLDL 08/02/2017 NOT REPORTED  1 - 30 mg/dL Final       Radiology Review:  Ct Head Wo Contrast    Result Date: 8/1/2017  EXAMINATION: CT OF THE HEAD WITHOUT CONTRAST  8/1/2017 2:14 pm ASSESSMENT AND PLAN:       Patient Active Problem List   Diagnosis    Dementia, multiinfarct    [facility]         Clearance ALEX Joel.   Neurology  8/2/2017  12:41 PM

## 2017-08-02 NOTE — H&P
HISTORY and Trecash Sharma 5747         NAME:  Elliot Nicole  MRN: [id number]   YOB: 1928   Date: 8/2/2017   Age: 80 y.o. Gender: male       COMPLAINT AND PRESENT HISTORY:    80 y o male admitted with episode of apparent confusion. Patient brought to the emergency department by EMS for confusion. The patient was found wandering at a gas station confused, with garbled speech. Unknown last known normal.  EMS report that while in transport his speech greatly improved. Patient states that he \"doesn't know what's going on\". He's having difficulty expressing himself. He doesn't know what time the symptoms started. Denies any weakness in his arms or legs. Patient continued to display irritation for a period of time, he was placed in restraints, and threatened suicide with a gun. Patient currently improved, but says he is not back to normal.  C/O lost memory whole day.       DIAGNOSTIC RESULTS   Radiology:   XR Chest Portable   Final Result   Opacity at the left lung apex. Although this may be secondary to overlying   rib shadows, an apical mass cannot be excluded. Further evaluation with   chest CT is recommended.           CT Head WO Contrast   Final Result   1. No clear evidence for acute intracranial hemorrhage or midline shift. 2. Severe chronic small vessel ischemic white matter disease with bilateral   lacunar infarcts as above. If there is clinical concern for acute on chronic   ischemia, an MRI with diffusion-weighted imaging would be a more sensitive   exam.   3. Atherosclerotic calcification of the vasculature. 4. Mild to moderate atrophy.                 EKG: .sinus bradycardia with a first-degree AV block.   There is 250, QRS of 100, QTC of 425 no ST segment elevation or depression no T-wave inversions axis is normal.       Labs:  CBC:   Recent Labs      08/01/17   1443  08/01/17   1625  08/02/17   0425   WBC  11.5*  10.8  10.6   HGB  15.1  15.4  14.4   PLT 164  167  161     BMP:    Recent Labs      08/01/17   1443  08/01/17   1625  08/02/17   0425   NA  136  137  137   K  4.3  4.5  3.8   CL  99  100  100   CO2  23  23  22   BUN  17  17  15   CREATININE  0.82  0.84  0.78   GLUCOSE  94  91  88     Hepatic:   Recent Labs      08/01/17   1443  08/01/17   1625   AST  16  23   ALT  15  14   BILITOT  0.58  0.62   ALKPHOS  84  87     CARDIAC ENZY:   Recent Labs      08/01/17   1443  08/01/17   1625   TROPONINT  <0.03  <0.03     INR: No results for input(s): INR in the last 72 hours. BNP: No results for input(s): BNP in the last 72 hours. ABGs: No results found for: PHART, PO2ART, BFF1PUA  Lipids:   Recent Labs      08/02/17   0425   CHOL  250*   HDL  46     U/A:  Lab Results   Component Value Date    COLORU YELLOW 08/01/2017    WBCUA 5 TO 10 03/15/2013    RBCUA 50  03/15/2013    MUCUS 1+ 03/15/2013    BACTERIA NOT REPORTED 03/15/2013    SPECGRAV 1.012 08/01/2017    LEUKOCYTESUR NEGATIVE 08/01/2017    GLUCOSEU NEGATIVE 08/01/2017    AMORPHOUS NOT REPORTED 03/15/2013       PAST MEDICAL HISTORY     Past Medical History:   Diagnosis Date    BPH (benign prostatic hyperplasia)     Hyperlipidemia     Hypertension    dementia    Pt denies any history of Diabetes mellitus type 2,  stroke, heart disease, COPD, Asthma, GERD,  Cancer, Seizures,Thyroid disease, Kidney Disease, Hepatitis, TB, Psychiatric Disorders or Substance abuse.     SURGICAL HISTORY       Past Surgical History:   Procedure Laterality Date    AORTIC VALVE REPLACEMENT      BACK SURGERY      EYE SURGERY      FEMUR FRACTURE SURGERY  1970    plates and screws       FAMILY HISTORY     Father heart disease, mother in 66's,     SOCIAL HISTORY       Social History     Social History    Marital status:      Spouse name: N/A    Number of children: N/A    Years of education: N/A     Social History Main Topics    Smoking status: Former Smoker     Types: Cigarettes     Quit date: 1958    Smokeless tobacco: None    Alcohol use No    Drug use: No    Sexual activity: No     Other Topics Concern    None     Social History Narrative    None           REVIEW OF SYSTEMS      No Known Allergies    No current facility-administered medications on file prior to encounter. No current outpatient prescriptions on file prior to encounter. General health:  Fairly good. No fever or chills. Skin:  No itching, redness or rash. Head, eyes, ears, nose, throat:  No headache, epistaxis, rhinorrhea hearing loss or sore throat. C/O headache,     Neck:  No pain, stiffness or masses. Cardiovascular/Respiratory system:  No chest pain, palpitation, shortness of breath, coughing or expectoration. Gastrointestinal tract: No abdominal pain, nausea, vomiting, diarrhea or constipation. Genitourinary:  No burning on micturition. No hesitancy, urgency, frequency or discoloration of urine. BPH,    Locomotor:  No bone or joint pains. No swelling or deformities. Right arm rotator cuff, arthritis,     Neuropsychiatric:  No referable complaints. Depression, undefined \"trouble with daughter\", possible TIA,    See HPI. GENERAL PHYSICAL EXAM:         Vitals: BP (!) 189/69  Pulse 59  Temp 97.5 °F (36.4 °C) (Oral)   Resp 18  Ht 5' 10\" (1.778 m)  Wt 177 lb 0.5 oz (80.3 kg)  SpO2 95%  BMI 25.4 kg/m2 Body mass index is 25.4 kg/(m^2). GENERAL APPEARANCE:  Omer Isaac is 80 y.o.,  male, not obese, nourished, conscious, alert. Does not appear to be distress or pain at this time. SKIN:  Warm, dry, no cyanosis or jaundice. HEAD:  Normocephalic, atraumatic, no swelling or tenderness. EYES:  Pupils equal, reactive to light, Conjunctiva is clear, EOMs intact dennis. eyelids WNL. EARS:  No discharge, no marked hearing loss. NOSE:  No rhinorrhea, epistaxis or septal deformity. THROAT:  Not congested. No ulceration bleeding or discharge.      NECK:

## 2017-08-02 NOTE — CONSULTS
Psychiatry Consult  Note          Reason for consult/chief complaint:                                              Admitted with altered mental status and agitation and does not remember about expressing suicidal thoughts,denies any suicidal thoughts at present,coop with care    Has been on celexa and aricept by PCP,denies any alcohol or drug use,noted prednisone in home meds,coop with care ,this is first time \"anything like this happened\"    HPI 80 y.o. male is brought to the emergency department by EMS for confusion. The patient was found wandering at a gas station confused, with garbled speech. Unknown last known normal.  EMS report that while in transport his speech greatly improved. Patient states that he \"doesn't know what's going on\". He's having difficulty expressing himself. He doesn't know what time the symptoms started. Denies any weakness in his arms or legs      Haja Echevarria is a 80 y.o. male. Current symptoms include anhedonia, difficulty concentrating and insomnia,depressed mood and difficulty concentrating , no hallucinations and no evidence of delusions    Past Medical History:   Diagnosis Date    BPH (benign prostatic hyperplasia)     Hyperlipidemia     Hypertension           History of Substance Abuse     Patient did not  present with substance use, being negative for alcohol and marijuana .       Family History of Psychiatric Illness No    History of Psychiatric Symptoms/Review of Systems:   Sera: No   Panic attacks:  No   Phobias: No   Obsessions and/or Compulsions: No   Involuntary body movements/tics: No   Hallucinations: No   Delusions: No   Trauma/PTSD:  No        MENTAL STATUS EXAMINATION  Behavior and Cooperation: Cooperative  Appearance: stated age     Eye Contact: good  Motor Signs: Agitation: no,  Speech:  fluent and spontaneous without dysarthric features  Mood:  calm, insomnia  Thought process Organized  Suicide Ideations: denies  Homicide Ideations:denies  Orientation: person place time   Intelligence:  Memory: recent and remote memory intact,       DIAGNOSTIC IMPRESSION      Major depressive disorder; recurrent and mild  Dementia         Treatment Plan    1. Psych medications reviewed and adjusted. 2.Ok to discharge when medically stable.  aspirin  324 mg Oral Daily    sodium chloride flush  10 mL Intravenous 2 times per day    enoxaparin  40 mg Subcutaneous Daily    pneumococcal 13-valent conjugate  0.5 mL Intramuscular Once     sodium chloride flush, sodium chloride flush, acetaminophen, hydrALAZINE, LORazepam    Thanks for the consult.     Jadon Lopez MD  Psychiatrist.

## 2017-08-02 NOTE — PROGRESS NOTES
Dr. Maria Alejandra Worley        Patient:  Camille Herrera  YOB: 1928    MRN: [id number]     Acct: [de-identified]     Admit date: 8/1/2017    Pt seen and Chart reviewed. Consultant notes reviewed and care evaluated. Problem List:  Patient Active Problem List   Diagnosis Code    Dementia, multiinfarct F01.50    CVA (cerebral vascular accident) ([facility] I63.9         Subjective: his symptoms haveresolved and he is more lucid today. Does seem to have amnesia from yesterday. reveiwed findings with the patient     Diet:  DIET GENERAL;      Medications:Current Inpatient    Scheduled Meds:   sodium chloride flush  10 mL Intravenous 2 times per day    enoxaparin  40 mg Subcutaneous Daily    pneumococcal 13-valent conjugate  0.5 mL Intramuscular Once     Continuous Infusions:   PRN Meds:sodium chloride flush, acetaminophen, hydrALAZINE, LORazepam    Objective:    Physical Exam:  Vitals: BP (!) 189/69  Pulse 59  Temp 97.5 °F (36.4 °C) (Oral)   Resp 18  Ht 5' 10\" (1.778 m)  Wt 177 lb 0.5 oz (80.3 kg)  SpO2 95%  BMI 25.4 kg/m2  24 hour intake/output:  Intake/Output Summary (Last 24 hours) at 08/02/17 0944  Last data filed at 08/01/17 2332   Gross per 24 hour   Intake                0 ml   Output              375 ml   Net             -375 ml     Last 3 weights:    Wt Readings from Last 3 Encounters:   08/02/17 177 lb 0.5 oz (80.3 kg)       Physical Examination:   General appearance - alert, well appearing, and in no distress  Mental status - alert, oriented to person, place, and time  Chest - clear to auscultation, no wheezes, rales or rhonchi, symmetric air entry  Heart - normal rate, regular rhythm, normal S1, S2, no murmurs, rubs, clicks or gallops  Abdomen - soft, nontender, nondistended, no masses or organomegaly  Neurological - alert, oriented, normal speech, no focal findings or movement disorder noted}  Extremities - peripheral pulses normal, no pedal edema, no clubbing or cyanosis  Skin -      Labs:-    CBC:   Recent Labs      08/01/17   1443  08/01/17   1625  08/02/17   0425   WBC  11.5*  10.8  10.6   HGB  15.1  15.4  14.4   PLT  164  167  161     BMP:  Recent Labs      08/01/17   1443  08/01/17   1625  08/02/17   0425   NA  136  137  137   K  4.3  4.5  3.8   CL  99  100  100   CO2  23  23  22   BUN  17  17  15   CREATININE  0.82  0.84  0.78   GLUCOSE  94  91  88     Glucose:No results for input(s): POCGLU in the last 72 hours. HgbA1C: No results for input(s): LABA1C in the last 72 hours. INR: No results for input(s): INR in the last 72 hours. CARDIAC ENZYMES:No results for input(s): CKTOTAL, CKMB, CKMBINDEX, TROPONINI in the last 72 hours. BNP: No results for input(s): BNP in the last 72 hours.   Lipids: Recent Labs      08/02/17   0425   CHOL  250*   TRIG  223*   HDL  46     ABGs: No results found for: PH, PCO2, PO2, HCO3, O2SAT  Thyroid: No results found for: TSH   Urinalysis: Color, UA   Date Value Ref Range Status   08/01/2017 YELLOW YEL Final     pH, UA   Date Value Ref Range Status   08/01/2017 6.0 5.0 - 8.0 Final     Specific Gravity, UA   Date Value Ref Range Status   08/01/2017 1.012 1.000 - 1.030 Final     Protein, UA   Date Value Ref Range Status   08/01/2017 NEGATIVE NEG Final     RBC, UA   Date Value Ref Range Status   03/15/2013 50  0 - 2 /HPF Final     Bacteria, UA   Date Value Ref Range Status   03/15/2013 NOT REPORTED NONE Final     Nitrite, Urine   Date Value Ref Range Status   08/01/2017 NEGATIVE NEG Final     WBC, UA   Date Value Ref Range Status   03/15/2013 5 TO 10 0 - 5 /HPF Final     Leukocyte Esterase, Urine   Date Value Ref Range Status   08/01/2017 NEGATIVE NEG Final     Yeast, UA   Date Value Ref Range Status   03/15/2013 NOT REPORTED NONE Final     Glucose, Ur   Date Value Ref Range Status   08/01/2017 NEGATIVE NEG Final

## 2017-08-02 NOTE — DISCHARGE SUMMARY
Admitted for Cva symptoms:expressive aphasia and confusion. Did clear . Ct noted multi infarcts, patient suffers from multiinfarct dementia. History of HTN his bp was elevated in this hospitalization but post CVA this was preserved and will be controlled as an outpatient. He expressed some thoughts of suicide in the midst of his confusion and was required to harsh restrained til cleared by pyschiatry as no longer being a threat to himself.   Then at discharge has a warrant for his arrest

## 2017-08-07 LAB
CULTURE: NORMAL
CULTURE: NORMAL
Lab: NORMAL
Lab: NORMAL
SPECIMEN DESCRIPTION: NORMAL
SPECIMEN DESCRIPTION: NORMAL
STATUS: NORMAL

## 2017-08-23 LAB
EKG ATRIAL RATE: 52 BPM
EKG P AXIS: 49 DEGREES
EKG P-R INTERVAL: 250 MS
EKG Q-T INTERVAL: 458 MS
EKG QRS DURATION: 100 MS
EKG QTC CALCULATION (BAZETT): 425 MS
EKG R AXIS: 26 DEGREES
EKG T AXIS: 61 DEGREES
EKG VENTRICULAR RATE: 52 BPM